# Patient Record
Sex: MALE | Race: BLACK OR AFRICAN AMERICAN | NOT HISPANIC OR LATINO | Employment: UNEMPLOYED | URBAN - METROPOLITAN AREA
[De-identification: names, ages, dates, MRNs, and addresses within clinical notes are randomized per-mention and may not be internally consistent; named-entity substitution may affect disease eponyms.]

---

## 2019-01-16 ENCOUNTER — HOSPITAL ENCOUNTER (EMERGENCY)
Facility: HOSPITAL | Age: 54
Discharge: STILL ADMITTED OR ADMINISTRATIVE | End: 2019-01-16
Attending: FAMILY MEDICINE

## 2019-01-16 VITALS
DIASTOLIC BLOOD PRESSURE: 97 MMHG | WEIGHT: 255 LBS | RESPIRATION RATE: 20 BRPM | OXYGEN SATURATION: 96 % | TEMPERATURE: 100 F | SYSTOLIC BLOOD PRESSURE: 182 MMHG | HEART RATE: 108 BPM

## 2019-01-16 DIAGNOSIS — R03.0 ELEVATED BLOOD PRESSURE READING: ICD-10-CM

## 2019-01-16 DIAGNOSIS — L03.211 FACIAL CELLULITIS: ICD-10-CM

## 2019-01-16 DIAGNOSIS — K04.7 DENTAL ABSCESS: Primary | ICD-10-CM

## 2019-01-16 LAB
ALBUMIN SERPL BCP-MCNC: 4.8 G/DL
ALP SERPL-CCNC: 69 U/L
ALT SERPL W/O P-5'-P-CCNC: 37 U/L
ANION GAP SERPL CALC-SCNC: 9 MMOL/L
AST SERPL-CCNC: 62 U/L
BASOPHILS # BLD AUTO: 0.02 K/UL
BASOPHILS NFR BLD: 0.2 %
BILIRUB SERPL-MCNC: 1 MG/DL
BUN SERPL-MCNC: 13 MG/DL
CALCIUM SERPL-MCNC: 9.8 MG/DL
CHLORIDE SERPL-SCNC: 102 MMOL/L
CO2 SERPL-SCNC: 29 MMOL/L
CREAT SERPL-MCNC: 0.73 MG/DL
DIFFERENTIAL METHOD: ABNORMAL
EOSINOPHIL # BLD AUTO: 0.1 K/UL
EOSINOPHIL NFR BLD: 1.1 %
ERYTHROCYTE [DISTWIDTH] IN BLOOD BY AUTOMATED COUNT: 12.2 %
EST. GFR  (AFRICAN AMERICAN): >60 ML/MIN/1.73 M^2
EST. GFR  (NON AFRICAN AMERICAN): >60 ML/MIN/1.73 M^2
GLUCOSE SERPL-MCNC: 86 MG/DL
HCT VFR BLD AUTO: 43.3 %
HGB BLD-MCNC: 13.8 G/DL
LYMPHOCYTES # BLD AUTO: 1.7 K/UL
LYMPHOCYTES NFR BLD: 16.2 %
MCH RBC QN AUTO: 30 PG
MCHC RBC AUTO-ENTMCNC: 31.9 G/DL
MCV RBC AUTO: 94 FL
MONOCYTES # BLD AUTO: 1.2 K/UL
MONOCYTES NFR BLD: 11.5 %
NEUTROPHILS # BLD AUTO: 7.5 K/UL
NEUTROPHILS NFR BLD: 70.8 %
PLATELET # BLD AUTO: 179 K/UL
PMV BLD AUTO: 12.7 FL
POCT GLUCOSE: 87 MG/DL (ref 70–110)
POTASSIUM SERPL-SCNC: 4.3 MMOL/L
PROT SERPL-MCNC: 8.8 G/DL
RBC # BLD AUTO: 4.6 M/UL
SODIUM SERPL-SCNC: 140 MMOL/L
WBC # BLD AUTO: 10.65 K/UL

## 2019-01-16 PROCEDURE — 87040 BLOOD CULTURE FOR BACTERIA: CPT | Mod: ER

## 2019-01-16 PROCEDURE — 80053 COMPREHEN METABOLIC PANEL: CPT | Mod: ER

## 2019-01-16 PROCEDURE — S0077 INJECTION, CLINDAMYCIN PHOSP: HCPCS | Mod: ER | Performed by: PHYSICIAN ASSISTANT

## 2019-01-16 PROCEDURE — 85025 COMPLETE CBC W/AUTO DIFF WBC: CPT | Mod: ER

## 2019-01-16 PROCEDURE — 63600175 PHARM REV CODE 636 W HCPCS: Mod: ER | Performed by: PHYSICIAN ASSISTANT

## 2019-01-16 PROCEDURE — 96365 THER/PROPH/DIAG IV INF INIT: CPT | Mod: ER

## 2019-01-16 PROCEDURE — 25500020 PHARM REV CODE 255: Mod: ER | Performed by: FAMILY MEDICINE

## 2019-01-16 PROCEDURE — 99285 EMERGENCY DEPT VISIT HI MDM: CPT | Mod: 25,ER

## 2019-01-16 PROCEDURE — 82962 GLUCOSE BLOOD TEST: CPT | Mod: ER

## 2019-01-16 PROCEDURE — 96375 TX/PRO/DX INJ NEW DRUG ADDON: CPT | Mod: ER

## 2019-01-16 PROCEDURE — 25000003 PHARM REV CODE 250: Mod: ER | Performed by: PHYSICIAN ASSISTANT

## 2019-01-16 RX ORDER — HYDRALAZINE HYDROCHLORIDE 20 MG/ML
5 INJECTION INTRAMUSCULAR; INTRAVENOUS
Status: COMPLETED | OUTPATIENT
Start: 2019-01-16 | End: 2019-01-16

## 2019-01-16 RX ORDER — KETOROLAC TROMETHAMINE 30 MG/ML
15 INJECTION, SOLUTION INTRAMUSCULAR; INTRAVENOUS
Status: COMPLETED | OUTPATIENT
Start: 2019-01-16 | End: 2019-01-16

## 2019-01-16 RX ORDER — CLINDAMYCIN PHOSPHATE 600 MG/50ML
600 INJECTION, SOLUTION INTRAVENOUS
Status: COMPLETED | OUTPATIENT
Start: 2019-01-16 | End: 2019-01-16

## 2019-01-16 RX ORDER — CLONIDINE HYDROCHLORIDE 0.1 MG/1
0.1 TABLET ORAL
Status: COMPLETED | OUTPATIENT
Start: 2019-01-16 | End: 2019-01-16

## 2019-01-16 RX ORDER — ACETAMINOPHEN 500 MG
1000 TABLET ORAL
Status: COMPLETED | OUTPATIENT
Start: 2019-01-16 | End: 2019-01-16

## 2019-01-16 RX ADMIN — ACETAMINOPHEN 1000 MG: 500 TABLET ORAL at 09:01

## 2019-01-16 RX ADMIN — CLINDAMYCIN IN 5 PERCENT DEXTROSE 600 MG: 12 INJECTION, SOLUTION INTRAVENOUS at 05:01

## 2019-01-16 RX ADMIN — KETOROLAC TROMETHAMINE 15 MG: 30 INJECTION, SOLUTION INTRAMUSCULAR at 05:01

## 2019-01-16 RX ADMIN — CLONIDINE HYDROCHLORIDE 0.1 MG: 0.1 TABLET ORAL at 06:01

## 2019-01-16 RX ADMIN — HYDRALAZINE HYDROCHLORIDE 5 MG: 20 INJECTION INTRAMUSCULAR; INTRAVENOUS at 07:01

## 2019-01-16 RX ADMIN — IOHEXOL 75 ML: 350 INJECTION, SOLUTION INTRAVENOUS at 05:01

## 2019-01-17 NOTE — ED PROVIDER NOTES
Encounter Date: 1/16/2019       History     Chief Complaint   Patient presents with    Dental Pain     abscess to left lower mouth x 1 week worsening-started with decayed tooth. did not see dentist. + swelling     Afebrile 53-year-old male who is a daily smoker reported history of HTN on no medications and no recent medical care presents to the ED for evaluation of left lower dental pain. Patient reports that he has known tooth fracture that occurred sometime ago to the left lower dental region.  He states for the past 2 weeks he began with some mild dental discomfort that has gradually worsened since onset.  He states for the past 4 days he began with facial swelling to the affected side.  He does report that he has difficulty opening his mouth and difficulty swallowing secondary to the pain. Patient denies any fever, chills, taste disturbance, vomiting, chest pain or shortness of breath. He has not tried any medications today for the symptoms.      The history is provided by the patient.     Review of patient's allergies indicates:  No Known Allergies  History reviewed. No pertinent past medical history.  History reviewed. No pertinent surgical history.  History reviewed. No pertinent family history.  Social History     Tobacco Use    Smoking status: Current Every Day Smoker     Types: Cigarettes   Substance Use Topics    Alcohol use: No     Frequency: Never    Drug use: Not on file     Review of Systems   Constitutional: Negative for chills and fever.   HENT: Positive for dental problem, facial swelling, sore throat and trouble swallowing. Negative for postnasal drip, rhinorrhea, sinus pressure and sinus pain.    Respiratory: Negative for shortness of breath and wheezing.    Cardiovascular: Negative for chest pain.   Gastrointestinal: Negative for nausea and vomiting.   Musculoskeletal: Positive for neck pain. Negative for back pain and neck stiffness.   Skin: Negative for rash.   Allergic/Immunologic:  Negative for immunocompromised state.   Neurological: Positive for headaches. Negative for dizziness, weakness and light-headedness.   Hematological: Positive for adenopathy. Does not bruise/bleed easily.       Physical Exam     Initial Vitals [01/16/19 1602]   BP Pulse Resp Temp SpO2   (!) 182/119 103 18 98.9 °F (37.2 °C) 98 %      MAP       --         Vitals:    01/16/19 1936   BP: (!) 191/107   Pulse: 91   Resp: 13   Temp:        Physical Exam    Nursing note and vitals reviewed.  Constitutional: He appears well-developed and well-nourished. He is cooperative.  Non-toxic appearance. He appears ill. He appears distressed.   HENT:   Head: Normocephalic and atraumatic.   Right Ear: Tympanic membrane normal. No mastoid tenderness.   Left Ear: Tympanic membrane normal.   Mouth/Throat: No oral lesions. There is trismus in the jaw. Dental caries present.   Patient has noted trismus and secondary to this I am unable to visualize the entire oropharynx.  Patient initially spitting up secretions however states he is able to tolerate it just hurts him.  No palpable drainable abscess in the buccal or vestibule region appreciated. Patient does have noted induration and edema of the affected side of the face that extends to the submental region.  No definite sublingual swelling although he has tenderness to palpation that is encroaching on the midline. No tracheal deviation.  No stridor.   Eyes: Conjunctivae and lids are normal.   Neck: No stridor present. Edema and erythema present. No tracheal deviation present.   Cardiovascular: Normal rate and regular rhythm.   Pulmonary/Chest: No respiratory distress. He has no wheezes. He has no rhonchi.   Abdominal: Soft. Normal appearance.   Lymphadenopathy:        Head (left side): Submental, submandibular and tonsillar adenopathy present.   Neurological: He is alert and oriented to person, place, and time. He has normal strength. GCS eye subscore is 4. GCS verbal subscore is 5. GCS  motor subscore is 6.   Skin: Skin is warm, dry and intact. No rash noted.   Psychiatric: He has a normal mood and affect. His speech is normal and behavior is normal. Thought content normal.         ED Course   Procedures  Labs Reviewed   CBC W/ AUTO DIFFERENTIAL - Abnormal; Notable for the following components:       Result Value    Hemoglobin 13.8 (*)     MCHC 31.9 (*)     Mono # 1.2 (*)     Lymph% 16.2 (*)     All other components within normal limits   COMPREHENSIVE METABOLIC PANEL - Abnormal; Notable for the following components:    Total Protein 8.8 (*)     AST 62 (*)     All other components within normal limits   CULTURE, BLOOD   CULTURE, BLOOD   COMPREHENSIVE METABOLIC PANEL   POCT GLUCOSE          Imaging Results          CT Sinuses W WO Contrast (Final result)  Result time 01/16/19 18:25:12   Procedure changed from CT Maxillofacial With Contrast     Final result by Javier Baker Jr., MD (01/16/19 18:25:12)                 Impression:      No definite drainable abscess.  Cellulitis/myositis of the left face as described above.  The periapical lucency could be the origin of the infection.    All CT scans at this facility use dose modulation, iterative reconstruction, and/or weight base dosing when appropriate to reduce radiation dose to as low as reasonably achievable.      Electronically signed by: Javier Baker Jr., MD  Date:    01/16/2019  Time:    18:25             Narrative:    EXAMINATION:  CT SINUSES W WO CONTRAST    CLINICAL HISTORY:  dental abscess;    TECHNIQUE:  Axial CT images were obtained through the face after administration of intravenous contrast. Coronal and sagittal reformats were also acquired.    COMPARISON:  None    FINDINGS:  Pronounced soft tissue swelling about the left , sublingual, and submandibular space with enlarged reactive lymph nodes.  There is reticulation of the subcutaneous fat.  Swelling of the left mid submandibular gland and left master muscle.  There is  periapical lucency involving the left premolar and left molar tooth.  There is no defined, drainable abscess.    Polypoid mucosal thickening of the left maxillary sinus.  Mucosal thickening of the frontal sinuses and ethmoid air cells.                                 Medical Decision Making:   Initial Assessment:   Nontoxic-appearing male presents the ED for evaluation of worsening left lower dental pain. Patient does report difficulty with opening mouth.  Patient reports difficulty with swallowing secondary to the pain however does state that he is able to tolerate his secretions.  Patient has noted trismus and secondary to this I am unable to visualize the entire oropharynx.  Patient initially spitting up secretions however states he is able to tolerate it just hurts him.  No palpable drainable abscess in the buccal or vestibule region appreciated. Patient does have noted induration and edema of the affected side of the face that extends to the submental region.  No definite sublingual swelling although he has tenderness to palpation that is encroaching on the midline. No tracheal deviation.  No stridor.  Differential Diagnosis:   Pain due to dental caries, pulpitis, dental abscess, facial cellulitis, deep space neck infection  ED Management:  Labs ordered to assess for this moderate edema and induration of the left face that extends into the neck region.  Labs with no significant abnormalities; blood cultures are pending.  CT reveals no definite drainable abscess however cellulitis versus myositis of the left face that involves the left , sublingual, submandibular spaces with reactive lymph nodes and reticulation of the subcutaneous fat.  There is some swelling of the left meds have mandibular gland which is likely secondary to the left premolar and molar tooth.  Due to extensiveness of involved areas I believe patient would benefit from evaluation by [facility] which we do not have at this facility.  We did  initiate the transfer process.  Patient did receive IV clindamycin, IV fluids, Toradol and clonidine for symptoms and elevated blood pressure in the ED.  Patient is accepted at [facility] by Dr. Dumont.  Discussed plan with patient and he is agreeable.  Patient appears in stable condition at this time.  Other:   I have discussed this case with another health care provider.                      Clinical Impression:   The primary encounter diagnosis was Dental abscess. Diagnoses of Facial cellulitis and Elevated blood pressure reading were also pertinent to this visit.                             TERRI Manning  01/16/19 1952

## 2019-01-21 LAB
BACTERIA BLD CULT: NORMAL
BACTERIA BLD CULT: NORMAL

## 2019-02-05 ENCOUNTER — HOSPITAL ENCOUNTER (EMERGENCY)
Facility: HOSPITAL | Age: 54
Discharge: HOME | End: 2019-02-05
Attending: EMERGENCY MEDICINE

## 2019-02-05 VITALS
WEIGHT: 260 LBS | HEART RATE: 83 BPM | SYSTOLIC BLOOD PRESSURE: 173 MMHG | OXYGEN SATURATION: 97 % | TEMPERATURE: 98 F | DIASTOLIC BLOOD PRESSURE: 107 MMHG | RESPIRATION RATE: 20 BRPM | HEIGHT: 75 IN | BODY MASS INDEX: 32.33 KG/M2

## 2019-02-05 DIAGNOSIS — J45.21 BRONCHITIS, ASTHMATIC, MILD INTERMITTENT, WITH ACUTE EXACERBATION: Primary | ICD-10-CM

## 2019-02-05 PROCEDURE — 94640 AIRWAY INHALATION TREATMENT: CPT | Mod: ER

## 2019-02-05 PROCEDURE — 63600175 PHARM REV CODE 636 W HCPCS: Mod: ER | Performed by: PHYSICIAN ASSISTANT

## 2019-02-05 PROCEDURE — 25000242 PHARM REV CODE 250 ALT 637 W/ HCPCS: Mod: ER | Performed by: PHYSICIAN ASSISTANT

## 2019-02-05 PROCEDURE — 99285 EMERGENCY DEPT VISIT HI MDM: CPT | Mod: 25,ER

## 2019-02-05 RX ORDER — ALBUTEROL SULFATE 2.5 MG/.5ML
2.5 SOLUTION RESPIRATORY (INHALATION)
Status: COMPLETED | OUTPATIENT
Start: 2019-02-05 | End: 2019-02-05

## 2019-02-05 RX ORDER — ALBUTEROL SULFATE 0.83 MG/ML
2.5 SOLUTION RESPIRATORY (INHALATION) EVERY 6 HOURS PRN
Qty: 25 EACH | Refills: 0 | Status: SHIPPED | OUTPATIENT
Start: 2019-02-05 | End: 2019-03-31 | Stop reason: SDUPTHER

## 2019-02-05 RX ORDER — IPRATROPIUM BROMIDE AND ALBUTEROL SULFATE 2.5; .5 MG/3ML; MG/3ML
3 SOLUTION RESPIRATORY (INHALATION)
Status: COMPLETED | OUTPATIENT
Start: 2019-02-05 | End: 2019-02-05

## 2019-02-05 RX ORDER — PREDNISONE 20 MG/1
20 TABLET ORAL 2 TIMES DAILY
Qty: 10 TABLET | Refills: 0 | Status: SHIPPED | OUTPATIENT
Start: 2019-02-05 | End: 2019-02-10

## 2019-02-05 RX ORDER — PREDNISONE 20 MG/1
60 TABLET ORAL
Status: COMPLETED | OUTPATIENT
Start: 2019-02-05 | End: 2019-02-05

## 2019-02-05 RX ORDER — CEPHALEXIN 500 MG/1
500 CAPSULE ORAL EVERY 6 HOURS
Qty: 28 CAPSULE | Refills: 0 | Status: SHIPPED | OUTPATIENT
Start: 2019-02-05 | End: 2019-02-12

## 2019-02-05 RX ORDER — ALBUTEROL SULFATE 90 UG/1
1-2 AEROSOL, METERED RESPIRATORY (INHALATION) EVERY 6 HOURS PRN
Qty: 1 INHALER | Refills: 0 | Status: SHIPPED | OUTPATIENT
Start: 2019-02-05 | End: 2019-03-31 | Stop reason: SDUPTHER

## 2019-02-05 RX ADMIN — ALBUTEROL SULFATE 2.5 MG: 2.5 SOLUTION RESPIRATORY (INHALATION) at 07:02

## 2019-02-05 RX ADMIN — PREDNISONE 60 MG: 20 TABLET ORAL at 06:02

## 2019-02-05 RX ADMIN — IPRATROPIUM BROMIDE AND ALBUTEROL SULFATE 3 ML: .5; 3 SOLUTION RESPIRATORY (INHALATION) at 06:02

## 2019-02-06 NOTE — ED TRIAGE NOTES
PT reports cough and SOB x 1 week. PT reports thick clear sputum. Pt reports he does not have medication at home

## 2019-02-06 NOTE — ED PROVIDER NOTES
Encounter Date: 2/5/2019       History     Chief Complaint   Patient presents with    Asthma     PT reports cough and SOB x 1 week. PT reports thick clear sputum. Pt reports he does not have medication at home     Patient is a 53-year-old male with history of asthma and hypertension.  He has not needed treatment for asthma for many years.  He is presenting with one-week history of cough productive clear and white sputum, shortness of breath and wheezing.  No exacerbating or relieving factors.  No known exposure to illness.  No fever or chills.  He has been taking over-the-counter cough medication without improvement.          Review of patient's allergies indicates:  No Known Allergies  Past Medical History:   Diagnosis Date    Asthma      History reviewed. No pertinent surgical history.  History reviewed. No pertinent family history.  Social History     Tobacco Use    Smoking status: Current Every Day Smoker     Packs/day: 1.00     Types: Cigarettes    Smokeless tobacco: Never Used   Substance Use Topics    Alcohol use: No     Frequency: Never    Drug use: No     Review of Systems   Constitutional: Negative for activity change, appetite change, fatigue and fever.   HENT: Negative for congestion, ear pain and sore throat.    Respiratory: Positive for cough, shortness of breath and wheezing. Negative for stridor.    Cardiovascular: Negative for chest pain, palpitations and leg swelling.   Gastrointestinal: Negative for abdominal pain, diarrhea, nausea and vomiting.   Musculoskeletal: Negative for myalgias, neck pain and neck stiffness.   Skin: Negative for rash.   Neurological: Negative for dizziness, weakness, numbness and headaches.   All other systems reviewed and are negative.      Physical Exam     Initial Vitals [02/05/19 1808]   BP Pulse Resp Temp SpO2   (!) 186/107 88 (!) 22 98 °F (36.7 °C) 95 %      MAP       --         Physical Exam    Nursing note and vitals reviewed.  Constitutional: He appears  well-developed and well-nourished. He appears distressed (Breathing is slightly labored).   HENT:   Head: Normocephalic.   Nose: Nose normal.   Mouth/Throat: Oropharynx is clear and moist.   Eyes: Conjunctivae and EOM are normal.   Neck: Normal range of motion. Neck supple.   Cardiovascular: Normal rate, regular rhythm, normal heart sounds and intact distal pulses.   Pulmonary/Chest: He is in respiratory distress (Breathing slightly labored). He has wheezes (Inspiratory and expiratory wheezes throughout). He has no rhonchi. He has no rales.   Abdominal: Soft. Bowel sounds are normal. There is no tenderness.   Musculoskeletal: He exhibits no edema.   Lymphadenopathy:     He has no cervical adenopathy.   Neurological: He is alert and oriented to person, place, and time.   Skin: Skin is warm and dry. No rash noted.   Psychiatric: He has a normal mood and affect. His behavior is normal. Judgment and thought content normal.         ED Course   Procedures  Labs Reviewed - No data to display       Imaging Results          X-Ray Chest PA And Lateral (Final result)  Result time 02/05/19 18:29:53    Final result by Benji Bhatt III, MD (02/05/19 18:29:53)                 Impression:      Negative two-view chest x-ray.      Electronically signed by: Benji Bhatt MD  Date:    02/05/2019  Time:    18:29             Narrative:    EXAMINATION:  XR CHEST PA AND LATERAL    CLINICAL HISTORY:  Asthma;    COMPARISON:  None    FINDINGS:  Normal heart size with mild tortuosity of the thoracic aorta.  Lung fields are clear.  Degenerative changes are noted in the spine.                                 Medical Decision Making:   Clinical Tests:   Radiological Study: Ordered and Reviewed  No acute findings on chest x-ray.  Patient treated with prednisone and nebulizer treatments and had only a very slight expiratory wheeze on the right upper lobe.  He was feeling much better and ready to go home.  Prescription for prednisone,  albuterol for nebulizer and inhaler, Keflex.  Advised on supportive care and the need to follow up with primary care.  He has been compliant with his blood pressure medications but blood pressure is slightly elevated tonight.  He will follow up with his PCP about that as well.  Return to the ED if worse in any way                      Clinical Impression:   The encounter diagnosis was Bronchitis, asthmatic, mild intermittent, with acute exacerbation.      Disposition:   Disposition: Discharged                        TERRI Leigh  02/06/19 [id number]

## 2019-02-06 NOTE — ED NOTES
Patient reports he takes a daily blood pressure medication once in the morning, he is unsure of the name of the medication and reports he did take his medication this morning.

## 2019-02-06 NOTE — ED NOTES
Patient sitting in chair in room finishing up a breathing treatment, patient aox4, nad, wife at bedside. Will continue to monitor.

## 2019-02-06 NOTE — ED NOTES
Pt discharged with instructions- BP discussed with Stacey MURRAY pt instructed to take his blood pressure medication as soon as possibe. 4 RX given and discussed how to take - pt repeated instructions.

## 2019-03-31 ENCOUNTER — HOSPITAL ENCOUNTER (EMERGENCY)
Facility: HOSPITAL | Age: 54
Discharge: HOME | End: 2019-03-31
Attending: EMERGENCY MEDICINE

## 2019-03-31 VITALS
DIASTOLIC BLOOD PRESSURE: 99 MMHG | RESPIRATION RATE: 20 BRPM | TEMPERATURE: 98 F | OXYGEN SATURATION: 96 % | HEART RATE: 80 BPM | HEIGHT: 75 IN | SYSTOLIC BLOOD PRESSURE: 165 MMHG | WEIGHT: 230 LBS | BODY MASS INDEX: 28.6 KG/M2

## 2019-03-31 DIAGNOSIS — I16.0 HYPERTENSIVE URGENCY: ICD-10-CM

## 2019-03-31 DIAGNOSIS — J45.21 MILD INTERMITTENT ACUTE ASTHMATIC BRONCHITIS WITH ACUTE EXACERBATION: Primary | ICD-10-CM

## 2019-03-31 PROCEDURE — 94640 AIRWAY INHALATION TREATMENT: CPT | Mod: ER

## 2019-03-31 PROCEDURE — 25000242 PHARM REV CODE 250 ALT 637 W/ HCPCS: Mod: ER | Performed by: EMERGENCY MEDICINE

## 2019-03-31 PROCEDURE — 25000003 PHARM REV CODE 250: Mod: ER | Performed by: SURGERY

## 2019-03-31 PROCEDURE — 25000242 PHARM REV CODE 250 ALT 637 W/ HCPCS: Mod: ER

## 2019-03-31 PROCEDURE — 99284 EMERGENCY DEPT VISIT MOD MDM: CPT | Mod: 25,ER

## 2019-03-31 RX ORDER — CLONIDINE HYDROCHLORIDE 0.1 MG/1
0.1 TABLET ORAL
Status: COMPLETED | OUTPATIENT
Start: 2019-03-31 | End: 2019-03-31

## 2019-03-31 RX ORDER — ALBUTEROL SULFATE 0.83 MG/ML
2.5 SOLUTION RESPIRATORY (INHALATION) EVERY 6 HOURS PRN
Qty: 25 EACH | Refills: 0 | Status: SHIPPED | OUTPATIENT
Start: 2019-03-31 | End: 2020-03-30

## 2019-03-31 RX ORDER — IPRATROPIUM BROMIDE AND ALBUTEROL SULFATE 2.5; .5 MG/3ML; MG/3ML
SOLUTION RESPIRATORY (INHALATION)
Status: COMPLETED
Start: 2019-03-31 | End: 2019-03-31

## 2019-03-31 RX ORDER — ALBUTEROL SULFATE 90 UG/1
1-2 AEROSOL, METERED RESPIRATORY (INHALATION) EVERY 6 HOURS PRN
Qty: 1 INHALER | Refills: 5 | Status: SHIPPED | OUTPATIENT
Start: 2019-03-31 | End: 2019-07-18 | Stop reason: SDUPTHER

## 2019-03-31 RX ORDER — IPRATROPIUM BROMIDE AND ALBUTEROL SULFATE 2.5; .5 MG/3ML; MG/3ML
3 SOLUTION RESPIRATORY (INHALATION)
Status: COMPLETED | OUTPATIENT
Start: 2019-03-31 | End: 2019-03-31

## 2019-03-31 RX ADMIN — IPRATROPIUM BROMIDE AND ALBUTEROL SULFATE 3 ML: .5; 3 SOLUTION RESPIRATORY (INHALATION) at 06:03

## 2019-03-31 RX ADMIN — IPRATROPIUM BROMIDE AND ALBUTEROL SULFATE 3 ML: 2.5; .5 SOLUTION RESPIRATORY (INHALATION) at 06:03

## 2019-03-31 RX ADMIN — IPRATROPIUM BROMIDE AND ALBUTEROL SULFATE 3 ML: .5; 3 SOLUTION RESPIRATORY (INHALATION) at 05:03

## 2019-03-31 RX ADMIN — CLONIDINE HYDROCHLORIDE 0.1 MG: 0.1 TABLET ORAL at 06:03

## 2019-03-31 NOTE — ED PROVIDER NOTES
Encounter Date: 3/31/2019       History     Chief Complaint   Patient presents with    Asthma     Asthma exacerbation x2 days, pt states he is out of his albuterol nebulizer     Patient complains of wheezing and congestion for 2 days.  He is an asthmatic who ran out of his medications he has a history of hypertension he did not take his a.m. medication he denies chest pain or headache    The history is provided by the patient.   Asthma   This is a new problem. The current episode started 2 days ago. The problem occurs daily. The problem has not changed since onset.Associated symptoms include shortness of breath. Pertinent negatives include no chest pain, no abdominal pain and no headaches. Nothing aggravates the symptoms. Nothing relieves the symptoms. He has tried nothing for the symptoms. The treatment provided no relief.     Review of patient's allergies indicates:  No Known Allergies  Past Medical History:   Diagnosis Date    Asthma     Hypertension      Past Surgical History:   Procedure Laterality Date    SPLENECTOMY       History reviewed. No pertinent family history.  Social History     Tobacco Use    Smoking status: Current Every Day Smoker     Packs/day: 1.00     Types: Cigarettes    Smokeless tobacco: Never Used   Substance Use Topics    Alcohol use: No     Frequency: Never    Drug use: No     Review of Systems   Constitutional: Negative.    HENT: Negative.    Eyes: Negative.    Respiratory: Positive for shortness of breath and wheezing.    Cardiovascular: Negative.  Negative for chest pain.   Gastrointestinal: Negative.  Negative for abdominal pain.   Endocrine: Negative.    Genitourinary: Negative.    Musculoskeletal: Negative.    Skin: Negative.    Allergic/Immunologic: Negative.    Neurological: Negative.  Negative for headaches.   Hematological: Negative.    Psychiatric/Behavioral: Negative.        Physical Exam     Initial Vitals   BP Pulse Resp Temp SpO2   03/31/19 0550 03/31/19 0544  03/31/19 0544 03/31/19 0544 03/31/19 0544   (!) 175/118 89 (!) 24 97.9 °F (36.6 °C) 95 %      MAP       --                Physical Exam    Nursing note and vitals reviewed.  Constitutional: He appears well-developed and well-nourished.   HENT:   Head: Normocephalic.   Eyes: Conjunctivae are normal.   Neck: Normal range of motion.   Cardiovascular: Normal rate and intact distal pulses.   Pulmonary/Chest: He has wheezes.   Abdominal: Soft.   Musculoskeletal: Normal range of motion.   Neurological: He is alert and oriented to person, place, and time. He has normal strength. GCS score is 15. GCS eye subscore is 4. GCS verbal subscore is 5. GCS motor subscore is 6.   Skin: Skin is warm. Capillary refill takes less than 2 seconds.   Psychiatric: He has a normal mood and affect.         ED Course   Procedures  Labs Reviewed - No data to display       Imaging Results    None          Medical Decision Making:   Initial Assessment:   Acute asthmatic exacerbation/hypertensive urgency  ED Management:  Patient's wheezing improved after 3 nebulizer treatments.  He was given a refill for his nebulizer and his inhaler.  The patient will go home and take his a.m. blood pressure medication                      Clinical Impression:       ICD-10-CM ICD-9-CM   1. Mild intermittent acute asthmatic bronchitis with acute exacerbation J45.21 466.0     493.92   2. Hypertensive urgency I16.0 401.9         Disposition:   Disposition: Discharged  Condition: Stable                        KHUSHI Petit III, MD  03/31/19 0634

## 2019-07-18 ENCOUNTER — HOSPITAL ENCOUNTER (EMERGENCY)
Facility: HOSPITAL | Age: 54
Discharge: HOME | End: 2019-07-18
Attending: EMERGENCY MEDICINE
Payer: MEDICAID

## 2019-07-18 VITALS
DIASTOLIC BLOOD PRESSURE: 111 MMHG | RESPIRATION RATE: 20 BRPM | HEIGHT: 75 IN | HEART RATE: 87 BPM | BODY MASS INDEX: 28.75 KG/M2 | SYSTOLIC BLOOD PRESSURE: 166 MMHG | TEMPERATURE: 98 F | OXYGEN SATURATION: 100 %

## 2019-07-18 DIAGNOSIS — J45.21 MILD INTERMITTENT ASTHMA WITH ACUTE EXACERBATION: Primary | ICD-10-CM

## 2019-07-18 PROCEDURE — 25000242 PHARM REV CODE 250 ALT 637 W/ HCPCS: Mod: ER | Performed by: PHYSICIAN ASSISTANT

## 2019-07-18 PROCEDURE — 63600175 PHARM REV CODE 636 W HCPCS: Mod: ER | Performed by: PHYSICIAN ASSISTANT

## 2019-07-18 PROCEDURE — 99285 EMERGENCY DEPT VISIT HI MDM: CPT | Mod: ER

## 2019-07-18 PROCEDURE — 25000003 PHARM REV CODE 250: Mod: ER | Performed by: PHYSICIAN ASSISTANT

## 2019-07-18 RX ORDER — PREDNISONE 20 MG/1
40 TABLET ORAL
Status: COMPLETED | OUTPATIENT
Start: 2019-07-18 | End: 2019-07-18

## 2019-07-18 RX ORDER — LISINOPRIL 20 MG/1
20 TABLET ORAL
Status: COMPLETED | OUTPATIENT
Start: 2019-07-18 | End: 2019-07-18

## 2019-07-18 RX ORDER — IPRATROPIUM BROMIDE AND ALBUTEROL SULFATE 2.5; .5 MG/3ML; MG/3ML
3 SOLUTION RESPIRATORY (INHALATION)
Status: COMPLETED | OUTPATIENT
Start: 2019-07-18 | End: 2019-07-18

## 2019-07-18 RX ORDER — ALBUTEROL SULFATE 2.5 MG/.5ML
5 SOLUTION RESPIRATORY (INHALATION) EVERY 4 HOURS PRN
Qty: 20 EACH | Refills: 3 | Status: SHIPPED | OUTPATIENT
Start: 2019-07-18 | End: 2020-07-17

## 2019-07-18 RX ORDER — LISINOPRIL 20 MG/1
20 TABLET ORAL DAILY
Qty: 30 TABLET | Refills: 3 | Status: SHIPPED | OUTPATIENT
Start: 2019-07-18 | End: 2020-07-17

## 2019-07-18 RX ORDER — ALBUTEROL SULFATE 90 UG/1
1-2 AEROSOL, METERED RESPIRATORY (INHALATION) EVERY 6 HOURS PRN
Qty: 1 INHALER | Refills: 5 | Status: SHIPPED | OUTPATIENT
Start: 2019-07-18 | End: 2020-07-17

## 2019-07-18 RX ADMIN — LISINOPRIL 20 MG: 20 TABLET ORAL at 05:07

## 2019-07-18 RX ADMIN — IPRATROPIUM BROMIDE AND ALBUTEROL SULFATE 3 ML: .5; 3 SOLUTION RESPIRATORY (INHALATION) at 05:07

## 2019-07-18 RX ADMIN — PREDNISONE 40 MG: 20 TABLET ORAL at 05:07

## 2019-07-18 NOTE — ED PROVIDER NOTES
"Encounter Date: 7/18/2019       History     Chief Complaint   Patient presents with    Shortness of Breath     Has a HX of astham " I ran out of my inhaler yesterday. I dont have a PCP so i could not get it filled so i been SOB."     Patient is a 53 year old male who presents with shortness of breath for four days. He has PMH significant for asthma and hypertension. He states he usually uses an inhaler and nebulizer but ran out of his medication four days ago. Since then he has been wheezing and feeling short of breath. He has been coughing up clear mucous. He states he is unsure what triggers his asthma. He does not have a primary care provider. He is unsure who last filled his medications. He is also out of his blood pressure medications. He denied any fever, chills, chest pain, leg swelling or other symptoms.     The history is provided by the patient.     Review of patient's allergies indicates:  No Known Allergies  Past Medical History:   Diagnosis Date    Asthma     Hypertension      Past Surgical History:   Procedure Laterality Date    SPLENECTOMY       History reviewed. No pertinent family history.  Social History     Tobacco Use    Smoking status: Current Every Day Smoker     Packs/day: 1.00     Types: Cigarettes    Smokeless tobacco: Never Used   Substance Use Topics    Alcohol use: No     Frequency: Never    Drug use: No     Review of Systems   Constitutional: Negative for activity change, appetite change, chills and fever.   HENT: Negative for congestion, rhinorrhea and sore throat.    Eyes: Negative for redness and visual disturbance.   Respiratory: Positive for cough, chest tightness, shortness of breath and wheezing.    Cardiovascular: Negative for chest pain, palpitations and leg swelling.   Gastrointestinal: Negative for abdominal pain, diarrhea, nausea and vomiting.   Genitourinary: Negative for dysuria and frequency.   Musculoskeletal: Negative for back pain, neck pain and neck stiffness. "   Skin: Negative for rash.   Neurological: Negative for dizziness, syncope, numbness and headaches.       Physical Exam     Vitals:    07/18/19 1729 07/18/19 1731 07/18/19 1746 07/18/19 1747   BP:    (!) 166/111   BP Location:       Patient Position:       Pulse: 84 81 86 87   Resp: 20 20     Temp:       TempSrc:       SpO2:   100% 100%   Height:           Physical Exam    Nursing note and vitals reviewed.  Constitutional: He appears well-developed and well-nourished. He is cooperative.  Non-toxic appearance. He does not have a sickly appearance.   HENT:   Head: Normocephalic and atraumatic.   Right Ear: External ear normal.   Left Ear: External ear normal.   Nose: Nose normal.   Mouth/Throat: Oropharynx is clear and moist.   Eyes: Conjunctivae and lids are normal. Pupils are equal, round, and reactive to light.   Neck: Normal range of motion and full passive range of motion without pain. Neck supple.   Cardiovascular: Normal rate, regular rhythm and normal heart sounds. Exam reveals no gallop and no friction rub.    No murmur heard.  Pulmonary/Chest: Effort normal. He has wheezes. He has no rhonchi. He has no rales.   No increased work of breathing. Diffuse wheezing and decreased air movement. Talking in complete sentences.    Abdominal: Soft. Normal appearance. There is no tenderness. There is no rigidity, no rebound and no guarding.   Neurological: He is alert.   Skin: Skin is warm, dry and intact. No rash noted.         ED Course   Procedures  Labs Reviewed - No data to display       Imaging Results    None          Medical Decision Making:   History:   Old Medical Records: I decided to obtain old medical records.  ED Management:  Urgent evaluation of a 53-year-old male who presents with acute asthma exacerbation that started approximately 4 days ago after running out of his albuterol and nebulizer.  He is talking in complete sentences.  He is in no acute distress.  He has no increased work of breathing.  He  does have decreased air movement and expiratory wheezing bilaterally.  Oxygen sats are stable. He was given prednisone and DuoNeb some with significant improvement.  He is noncompliant with his blood pressure medications.  He does not follow up primary care.  I discussed with him that his blood pressure is elevated.  I did call the pharmacy and see what medication he had last filled.  This is lisinopril.  I will give him a short supply.  Discussed keeping a blood pressure log and following up as planned.  Smoking cessation discussed.  Patient is safe for discharge and outpatient follow-up. Return precautions given.                       Clinical Impression:       ICD-10-CM ICD-9-CM   1. Mild intermittent asthma with acute exacerbation J45.21 493.92                                Haleigh Donnelly PA-C  07/18/19 1833
